# Patient Record
(demographics unavailable — no encounter records)

---

## 2019-03-28 NOTE — OR
[facility]
                                    [address], [place]  [address]
_________________________________________________________________________________________
                                                                 Draft    
 
 
DATE OF OPERATION:
03/28/2019
 
SURGEON:
Mayra Khan MD
 
PREOPERATIVE DIAGNOSES:
1. Diverticulosis.
2. Screening.
 
POSTOPERATIVE DIAGNOSES:
1. Moderate diverticulosis in transverse colon, left colon, and sigmoid colon.
2. 5 mm polyp at 35 cm.
3. 8 mm polyp distal right colon/hepatic flexure (tattoo).
4. 4 mm polyp ileocecal valve.
5. 4 mm polyps x2 hepatic flexure.
6. 4 mm polyps x2 proximal transverse colon.
7. Moderate internal and external hemorrhoids.
 
PROCEDURE PERFORMED:
Colonoscopy with snare at hepatic flexure and proximal transverse colon and hot
biopsies, and injection of tattoo at the distal right colon/hepatic flexure. 
 
ESTIMATED BLOOD LOSS:
None.
 
INDICATIONS:
Lashanda is a 74-year-old gentleman who came to us for basically a followup screening
colonoscopy.  He said his last colonoscopy was back in 2003.  He remembers having
sigmoid diverticulosis at that time.  Currently, he has no lower GI complaints.  There
was no family history of colon cancer or polyps.  I gave him a pamphlet in the office on
colonoscopy.  We looked at that together along with the risks and benefits.  He
understands there is risk including, but not limited to gas, bloating, crampy abdominal
pain, bleeding, perforation, requiring surgery, and missed diagnosis.  He also
understands the need for IV conscious sedation.  He had expressed understanding and
wished to proceed. 
 
DESCRIPTION OF PROCEDURE:
Lashanda was taken into our endoscopy suite and placed in the left lateral decubitus
position.  He was given 4 mg of Versed and 100 mcg of fentanyl to cover the case.  A
digital rectal exam was performed.  He does have moderate external hemorrhoids.  He had
good sphincter tone.  His prostate is mildly enlarged and indurated.  No dominant
 
                                                                                    
_________________________________________________________________________________________
PATIENT NAME:     LASHANDA RAHMAN JR                 
MEDICAL RECORD #: [id number]            OPERATIVE REPORT              
          ACCT #: [id number]  
DATE OF BIRTH:   04/08/44            REPORT #: [id number]      
PHYSICIAN:        MAYRA KHAN MD             
PCP:              MAYRA KHAN MD             
REPORT IS CONFIDENTIAL AND NOT TO BE RELEASED WITHOUT AUTHORIZATION
 
 
                                  [facility]
                                    [address], [place]  [address]
_________________________________________________________________________________________
                                                                 Draft    
 
 
nodules.  The adult colonoscope was introduced and advanced all around into the cecum
under direct visualization of camera without difficulty.  The scope was slowly
withdrawn.  We took pictures throughout for photodocumentation.  We could easily see the
appendiceal orifice, the Crohn's foot, and ileocecal valve.  The above-mentioned polyps
were removed mostly with hot biopsy forceps.  We did use our snare in the distal right
colon/hyperplastic flexure in the proximal transverse colon.  We did place a tattoo in
the distal right colon/hepatic flexure to aristeo that particular polyp.  He does have
moderate diverticulosis from the transverse colon, left colon, and the sigmoid colon.
The rectum was unremarkable.  Upon retroflexion of scope he does have moderate internal
hemorrhoids.  After this, the gas was suctioned out.  The colonoscope removed.  Lashanda
tolerated the procedure quite well. 
 
RECOMMENDATIONS:
I will see Lashanda back in my office in 7 to 14 days to review his results.
 
 
 
            ________________________________________
            Mayra Khan MD 
 
 
ALB/MODL
Job #:  [id number]
DD:  03/28/2019 08:22:38
DT:  03/28/2019 15:14:11
 
cc:            Patient Chart 
 
               Zaheer [facility] 
 
               Mayra Khan MD
 
 
Copies:  MAYRA KHAN MD
~
 
 
 
 
 
                                                                                    
_________________________________________________________________________________________
PATIENT NAME:     JOSIAH LASHANDA MONTEZ                 
MEDICAL RECORD #: [id number]            OPERATIVE REPORT              
          ACCT #: [id number]  
DATE OF BIRTH:   04/08/44            REPORT #: [id number]      
PHYSICIAN:        MAYRA KHAN MD             
PCP:              MAYRA KHAN MD             
REPORT IS CONFIDENTIAL AND NOT TO BE RELEASED WITHOUT AUTHORIZATION

## 2019-03-28 NOTE — NUR
03/28/19 0814 Concha Bledsoe
0811-PATIENT ARRIVED TO PACU ON 2L NC REACTIVE TO VOICE OPENING EYES
DROWSY. DENIES PAIN OR NAUSEA. PASSING GAS. LAYING LEFT LATERAL. IVF
INFUSING.